# Patient Record
Sex: FEMALE | URBAN - METROPOLITAN AREA
[De-identification: names, ages, dates, MRNs, and addresses within clinical notes are randomized per-mention and may not be internally consistent; named-entity substitution may affect disease eponyms.]

---

## 2019-10-23 ENCOUNTER — EMERGENCY (EMERGENCY)
Facility: HOSPITAL | Age: 24
LOS: 1 days | Discharge: HOME | End: 2019-10-23
Attending: EMERGENCY MEDICINE
Payer: MEDICAID

## 2019-10-23 VITALS
DIASTOLIC BLOOD PRESSURE: 77 MMHG | TEMPERATURE: 98 F | OXYGEN SATURATION: 99 % | SYSTOLIC BLOOD PRESSURE: 122 MMHG | RESPIRATION RATE: 18 BRPM | HEART RATE: 89 BPM

## 2019-10-23 LAB — S PYO AG SPEC QL IA: NEGATIVE — SIGNIFICANT CHANGE UP

## 2019-10-23 PROCEDURE — 87880 STREP A ASSAY W/OPTIC: CPT

## 2019-10-23 PROCEDURE — 87081 CULTURE SCREEN ONLY: CPT

## 2019-10-23 PROCEDURE — 99284 EMERGENCY DEPT VISIT MOD MDM: CPT

## 2019-10-23 PROCEDURE — 71046 X-RAY EXAM CHEST 2 VIEWS: CPT | Mod: 26

## 2019-10-23 PROCEDURE — 71046 X-RAY EXAM CHEST 2 VIEWS: CPT

## 2019-10-23 PROCEDURE — 99283 EMERGENCY DEPT VISIT LOW MDM: CPT

## 2019-10-23 RX ORDER — DEXAMETHASONE 0.5 MG/5ML
10 ELIXIR ORAL ONCE
Refills: 0 | Status: COMPLETED | OUTPATIENT
Start: 2019-10-23 | End: 2019-10-23

## 2019-10-23 RX ORDER — IBUPROFEN 200 MG
400 TABLET ORAL ONCE
Refills: 0 | Status: COMPLETED | OUTPATIENT
Start: 2019-10-23 | End: 2019-10-23

## 2019-10-23 RX ADMIN — Medication 400 MILLIGRAM(S): at 17:13

## 2019-10-23 RX ADMIN — Medication 2 MILLIGRAM(S): at 15:50

## 2019-10-23 RX ADMIN — Medication 400 MILLIGRAM(S): at 15:49

## 2019-10-23 NOTE — ED PROVIDER NOTE - PATIENT PORTAL LINK FT
You can access the FollowMyHealth Patient Portal offered by  by registering at the following website: [email or web address]. By joining [email or web address]’s FollowMyHealth portal, you will also be able to view your health information using other applications (apps) compatible with our system.

## 2019-10-23 NOTE — ED PROVIDER NOTE - ATTENDING CONTRIBUTION TO CARE
pt with ms reproducible chest pain in a low risk patient. tonsills with no strept throat on swab already being treated. supportive care.

## 2019-10-23 NOTE — ED ADULT NURSE NOTE - CHPI ED NUR SYMPTOMS NEG
no shortness of breath/no vomiting/no back pain/no congestion/no syncope/no diaphoresis/no dizziness/no fever/no chills/no nausea

## 2019-10-23 NOTE — ED PROVIDER NOTE - NSFOLLOWUPINSTRUCTIONS_ED_ALL_ED_FT
You were seen in the Emergency Department for chest pain.   1) Advance activity as tolerated.    2) Continue all previously prescribed medications as directed.    3) Follow up with your primary care physician in 24-48 hours - take copies of your results.    4) Return to the Emergency Department for worsening or persistent symptoms, and/or ANY NEW OR CONCERNING SYMPTOMS. If you have issues obtaining follow up, please call: [phone] to obtain a doctor or specialist who takes your insurance in your area.

## 2019-10-23 NOTE — ED PROVIDER NOTE - OBJECTIVE STATEMENT
25 yo previously healthy f presenting with 1 day of fleeting, substernal chest pain that last for seconds, non radiating, that is worse with walking and bending forward. Moderate severity. Patient seen 3 days ago at urgent care and at PCP for throat pain and some lymph node swelling. Patient prescribed antibiotics and NSAIDs although unsure of the medication's name and has been taking them. Denies fever, cough, headache, neck pain, rash. Chest pain started this morning. Not on birth-control. Denies leg swelling. Denies previous clots.

## 2019-10-23 NOTE — ED PROVIDER NOTE - PHYSICAL EXAMINATION
Gen: Alert and oriented. Lying comfortably in bed. Answering questions appropriately  HEENT: enlarged and tender submandibular lymphadenopathy bl. Pharynx clear with no exudates. extra occular movements intact, no nasal discharge, mucous membranes moist  CV: Regular rate and rhythm, +S1/S2, no murmurs/rubs/gallops,   Resp: Clear to ausculation bilaterally, no wheezes/rhonchi/rales  GI: Abdomen soft non-distended, non tender to palpation, no masses  MSK: Tenderness to palpation over sternum which reproduces pain. No open wounds, no bruising, no LE edema, Homans sign negative bl  Neuro: A&Ox4, following commands, moving all four extremities spontaneously  Psych: appropriate mood

## 2019-10-23 NOTE — ED ADULT NURSE NOTE - OBJECTIVE STATEMENT
pt is on amoxicillin for swollen lymph glands   today she developed CP in the am and was sent via EMS from [facility] to the ER.  pain has been continuous and is worsened on exertion.  denies cough or trauma.

## 2019-10-23 NOTE — ED PROVIDER NOTE - CLINICAL SUMMARY MEDICAL DECISION MAKING FREE TEXT BOX
Joseph Frankel PGY1: Patient with chest pain. VSS. Patient looks well and is non toxic appearing. PE as above. Chest pain most likely MSK in origin as pain reproducible on palpation. Low concern for ACS as chest pain atypical and ECG normal with no family history. Less likely pericarditis as no ECG pain and no friction rub on exam. Less likely PE as patient PERCs out. Will get strep swab and CXR. Will give motrin and decadron for lymphnode swelling and reassess.

## 2019-10-23 NOTE — ED PROVIDER NOTE - NS ED ROS FT
Gen: Denies fever, weight loss  CV: + chest pain, denies palpitations  HEENT: Denies neck pain, +sore throat, denies eye discharge  Skin: Denies rash, erythema, color changes  Resp: Denies SOB, cough  Endo: Denies sensitivity to heat, cold, increased urination  GI: Denies constipation, nausea, vomiting  Msk: Denies back pain, LE swelling, extremity pain  : Denies dysuria, increased frequency  Neuro: Denies LOC, weakness, seizures  Psych: Denies hx of psych, hallucinations, SI

## 2023-03-30 NOTE — ED PROVIDER NOTE - NS ED SCRIBE STATEMENT
Attending Olumiant Counseling: I discussed with the patient the risks of Olumiant therapy including but not limited to upper respiratory tract infections, shingles, cold sores, and nausea. Live vaccines should be avoided.  This medication has been linked to serious infections; higher rate of mortality; malignancy and lymphoproliferative disorders; major adverse cardiovascular events; thrombosis; gastrointestinal perforations; neutropenia; lymphopenia; anemia; liver enzyme elevations; and lipid elevations.